# Patient Record
(demographics unavailable — no encounter records)

---

## 2025-03-07 NOTE — PLAN
[FreeTextEntry1] : Pap smear drawn and sent today. Prescription for mammogram and breast ultrasound given to be done in May.

## 2025-03-07 NOTE — HISTORY OF PRESENT ILLNESS
[Patient reported PAP Smear was normal] : Patient reported PAP Smear was normal [Y] : Positive pregnancy history [Previously active] : previously active [Men] : men [TextBox_4] : Postpartum preeclampsia after delivery of twins in 2023.  Required hospitalization for 8 days with 3 antihypertensives.  Is currently just on labetalol for chronic hypertension and is followed by a cardiologist at [place] [Mammogramdate] : 05/24 [TextBox_19] : [facility])-benign [BreastSonogramDate] : 05/24 [TextBox_25] : [facility] [place]) [PapSmeardate] : 01/24 [TextBox_31] : Negative [BoneDensityDate] : N/A [ColonoscopyDate] : NEVER [LMPDate] : 12/01/22 [PGxTotal] : 7 [[facility]] : 3 [[facility]] : 4 [PGxABSpont] : 3 [FreeTextEntry1] : 2 , 1  SECTION- TWINS

## 2025-03-07 NOTE — PHYSICAL EXAM
[Appropriately responsive] : appropriately responsive [Alert] : alert [No Acute Distress] : no acute distress [No Lymphadenopathy] : no lymphadenopathy [Regular Rate Rhythm] : regular rate rhythm [No Murmurs] : no murmurs [Clear to Auscultation B/L] : clear to auscultation bilaterally [Soft] : soft [Non-tender] : non-tender [Non-distended] : non-distended [No HSM] : No HSM [No Lesions] : no lesions [No Mass] : no mass [Oriented x3] : oriented x3 [FreeTextEntry1] : Normal, no lesions [FreeTextEntry2] : Normal, no lesions [FreeTextEntry4] : Normal, no lesions seen or palpated.  Scanty white discharge in vault [FreeTextEntry5] : Smooth, pink, no lesions.  No cervical motion tenderness [FreeTextEntry6] : Anteverted, small, mobile, nontender.  No adnexal masses or tenderness bilaterally

## 2025-04-07 NOTE — HISTORY OF PRESENT ILLNESS
[(Patient denies any chest pain, claudication, dyspnea on exertion, orthopnea, palpitations or syncope)] : Patient denies any chest pain, claudication, dyspnea on exertion, orthopnea, palpitations or syncope [Aortic Stenosis] : no aortic stenosis [Atrial Fibrillation] : no atrial fibrillation [Coronary Artery Disease] : no coronary artery disease [Recent Myocardial Infarction] : no recent myocardial infarction [Implantable Device/Pacemaker] : no implantable device/pacemaker [Asthma] : no asthma [COPD] : no COPD [Sleep Apnea] : no sleep apnea [Smoker] : not a smoker [Chronic Anticoagulation] : no chronic anticoagulation [Chronic Kidney Disease] : no chronic kidney disease [Diabetes] : no diabetes [FreeTextEntry1] : gastric sleeve  [FreeTextEntry2] : 4/9/25 [FreeTextEntry3] : Dr. Hutchinson  [FreeTextEntry4] : 43 year old female with pmhx of HTN, bipolar d/o, hypothyroidism presents for medical clearance for gastric sleeve procedure. She feels well overall with no concerns. Denies CP, SOB, palpitations  She has seen cardiology, pulm and GI.  [FreeTextEntry8] : METS > 4, can walk up a flight of stairs

## 2025-04-07 NOTE — ASSESSMENT
[Patient Optimized for Surgery] : Patient optimized for surgery [No Further Testing Recommended] : no further testing recommended [FreeTextEntry4] : Moderate risk pt for intermediate risk procedure.There is no contraindication to planned procedure

## 2025-04-07 NOTE — REVIEW OF SYSTEMS
[Fever] : no fever [Chills] : no chills [Fatigue] : no fatigue [Discharge] : no discharge [Vision Problems] : no vision problems [Earache] : no earache [Nasal Discharge] : no nasal discharge [Sore Throat] : no sore throat [Chest Pain] : no chest pain [Palpitations] : no palpitations [Shortness Of Breath] : no shortness of breath [Wheezing] : no wheezing [Cough] : no cough [Abdominal Pain] : no abdominal pain [Nausea] : no nausea [Diarrhea] : diarrhea [Vomiting] : no vomiting [Dysuria] : no dysuria [Hematuria] : no hematuria [Headache] : no headache [Dizziness] : no dizziness [Fainting] : no fainting [Anxiety] : no anxiety [Depression] : no depression [Easy Bleeding] : no easy bleeding [Easy Bruising] : no easy bruising